# Patient Record
Sex: FEMALE | Race: WHITE | NOT HISPANIC OR LATINO | Employment: FULL TIME | URBAN - NONMETROPOLITAN AREA
[De-identification: names, ages, dates, MRNs, and addresses within clinical notes are randomized per-mention and may not be internally consistent; named-entity substitution may affect disease eponyms.]

---

## 2020-01-20 ENCOUNTER — OFFICE VISIT (OUTPATIENT)
Dept: CARDIOLOGY | Facility: CLINIC | Age: 55
End: 2020-01-20

## 2020-01-20 VITALS
WEIGHT: 142.2 LBS | DIASTOLIC BLOOD PRESSURE: 77 MMHG | SYSTOLIC BLOOD PRESSURE: 114 MMHG | HEART RATE: 76 BPM | BODY MASS INDEX: 23.69 KG/M2 | OXYGEN SATURATION: 99 % | HEIGHT: 65 IN

## 2020-01-20 DIAGNOSIS — R07.2 PRECORDIAL PAIN: Primary | ICD-10-CM

## 2020-01-20 DIAGNOSIS — Z76.89 ENCOUNTER TO ESTABLISH CARE: ICD-10-CM

## 2020-01-20 DIAGNOSIS — R06.02 SHORTNESS OF BREATH: ICD-10-CM

## 2020-01-20 DIAGNOSIS — R00.2 PALPITATIONS: ICD-10-CM

## 2020-01-20 DIAGNOSIS — R09.89 LEFT CAROTID BRUIT: ICD-10-CM

## 2020-01-20 PROCEDURE — 99204 OFFICE O/P NEW MOD 45 MIN: CPT | Performed by: PHYSICIAN ASSISTANT

## 2020-01-20 PROCEDURE — 93000 ELECTROCARDIOGRAM COMPLETE: CPT | Performed by: PHYSICIAN ASSISTANT

## 2020-01-20 NOTE — PROGRESS NOTES
Subjective   Sherie Reyes is a 54 y.o. female     Chief Complaint   Patient presents with   • Establish Care     Here to establish care        HPI  The patient presents today to establish cardiac care.  She presents predominantly because of dyspnea, but also because of intermittent chest tightness and history of fatigue as well.  She is concerned about potential cardiac involvement in her rather extensive current past medical history issues.  The patient reports history of lupus and scleroderma.  She would be concerned about a cardiac involvement as well.  Over the past several months, she has noted progressive dyspnea.  This now limits activity.  She has intermittent chest tightness at times.  She has precordial chest discomfort.  Again this is a tightness or pressure.  She has no referral to the neck, arm, or jaw.  She will have intensified dyspnea at that time.  She has no diaphoresis or nausea.  At times, she will have palpitations and even a sensation of tachycardia.  Symptoms tend to last several minutes and then resolved completely.  Dyspnea resolves when she rests for a prolonged amount of time.  Exercise capacity is progressively declining.  She has no PND orthopnea.  She has no syncope.  Blood pressures tend to be well controlled.  The patient reports normal labs when followed through her primary care provider recently.  She has no further complaints.  She would like evaluation at this time.      No current outpatient medications on file.     No current facility-administered medications for this visit.        Patient has no known allergies.    Past Medical History:   Diagnosis Date   • Lupus (CMS/HCC)    • Mixed connective tissue disease (CMS/HCC)    • Scleroderma (CMS/HCC)        Social History     Socioeconomic History   • Marital status:      Spouse name: Not on file   • Number of children: Not on file   • Years of education: Not on file   • Highest education level: Not on file   Tobacco Use   •  "Smoking status: Never Smoker   • Smokeless tobacco: Never Used   Substance and Sexual Activity   • Alcohol use: Never     Frequency: Never   • Drug use: Never   • Sexual activity: Defer       Family History   Problem Relation Age of Onset   • Hypertension Mother    • Heart attack Father    • Stroke Father        Review of Systems   Constitutional: Positive for fatigue (stays tired ). Negative for chills and fever.   HENT: Negative.  Negative for congestion and rhinorrhea.    Eyes: Positive for visual disturbance (reading glasses ).   Respiratory: Positive for cough (constant dry cough ), chest tightness (tightness in chest most of the time ) and shortness of breath (SOA with exertion ).    Cardiovascular: Positive for chest pain, palpitations (occasional flutters ) and leg swelling (occasional puffiness which resolves overnight ).   Gastrointestinal: Negative.  Negative for abdominal pain, blood in stool, nausea and vomiting.   Endocrine: Positive for cold intolerance. Negative for heat intolerance.   Genitourinary: Negative.  Negative for dysuria, frequency, hematuria and urgency.   Musculoskeletal: Positive for arthralgias (joints ). Negative for back pain.   Skin: Negative.  Negative for rash and wound.   Allergic/Immunologic: Negative.  Negative for environmental allergies and food allergies.   Neurological: Positive for weakness (weakness on right side ). Negative for dizziness and light-headedness.        Right foot feels cold most of the time   Hematological: Bruises/bleeds easily (bruises easily ).   Psychiatric/Behavioral: Positive for agitation (easily agitated ). Negative for confusion and sleep disturbance (denies waking with smothering or SOA). The patient is nervous/anxious (easily nervous ).        Objective     Vitals:    01/20/20 1105   BP: 114/77   BP Location: Left arm   Patient Position: Sitting   Pulse: 76   SpO2: 99%   Weight: 64.5 kg (142 lb 3.2 oz)   Height: 165.1 cm (65\")        /77 " "(BP Location: Left arm, Patient Position: Sitting)   Pulse 76   Ht 165.1 cm (65\")   Wt 64.5 kg (142 lb 3.2 oz)   SpO2 99%   BMI 23.66 kg/m²      Lab Results (most recent)     None          Physical Exam   Constitutional: She is oriented to person, place, and time. She appears well-developed and well-nourished. No distress.   HENT:   Head: Normocephalic and atraumatic.   Eyes: Pupils are equal, round, and reactive to light. Conjunctivae and EOM are normal.   Neck: Normal range of motion. Neck supple. No JVD present. Carotid bruit is present (Left). No tracheal deviation present.   Cardiovascular: Normal rate, regular rhythm, normal heart sounds and intact distal pulses.   Pulmonary/Chest: Effort normal and breath sounds normal.   Abdominal: Soft. Bowel sounds are normal. She exhibits no distension and no mass. There is no tenderness. There is no rebound and no guarding.   Musculoskeletal: Normal range of motion. She exhibits no edema, tenderness or deformity.   Neurological: She is alert and oriented to person, place, and time.   Skin: Skin is warm and dry. No rash noted. No erythema. No pallor.   Psychiatric: She has a normal mood and affect. Her behavior is normal. Judgment and thought content normal.   Nursing note and vitals reviewed.      Procedure     ECG 12 Lead  Date/Time: 1/20/2020 11:13 AM  Performed by: Sudhir Gill PA  Authorized by: Sudhir Gill PA   Previous ECG: no previous ECG available  Comments: Sinus rhythm at 66, normal axis, probable right atrial enlargement, no acute changes noted.                 Assessment/Plan      Diagnosis Plan   1. Encounter to establish care  ECG 12 Lead    Adult Transthoracic Echo Complete W/ Cont if Necessary Per Protocol    Stress Test With Myocardial Perfusion One Day    Duplex Carotid Ultrasound CAR   2. Precordial pain  Adult Transthoracic Echo Complete W/ Cont if Necessary Per Protocol    Stress Test With Myocardial Perfusion One Day    Duplex " Carotid Ultrasound CAR   3. Shortness of breath  Adult Transthoracic Echo Complete W/ Cont if Necessary Per Protocol    Stress Test With Myocardial Perfusion One Day    Duplex Carotid Ultrasound CAR   4. Palpitations  Adult Transthoracic Echo Complete W/ Cont if Necessary Per Protocol    Stress Test With Myocardial Perfusion One Day    Duplex Carotid Ultrasound CAR   5. Left carotid bruit  Adult Transthoracic Echo Complete W/ Cont if Necessary Per Protocol    Stress Test With Myocardial Perfusion One Day    Duplex Carotid Ultrasound CAR     1.  With increasing episodes of dyspnea and episodes of chest tightness noted lately as well, I will schedule the patient for a nuclear stress test for ischemia assessment.    2.  Because of history of lupus and medical history otherwise as outlined above, the patient will need an echo to evaluate her structurally.  We need to evaluate LV size and function, valvular morphologies, we can evaluate the pericardium, we can evaluate right-sided parameters, etc.    3.  The patient has an extremely soft left carotid bruit.  Because of that I would like to schedule for carotid duplex as well.    4.  For now, I would not add or make no changes to medical regimen.  We can see her back to review study results and make changes at that time if felt indicated.    5.  The patient will call us for any complications.  I will see her back to review results of the above studies and recommended further at that time.           Sherie Reyes  reports that she has never smoked. She has never used smokeless tobacco..        Patient's Body mass index is 23.66 kg/m². BMI is within normal parameters. No follow-up required..           Electronically signed by:

## 2020-02-11 ENCOUNTER — HOSPITAL ENCOUNTER (OUTPATIENT)
Dept: CARDIOLOGY | Facility: HOSPITAL | Age: 55
Discharge: HOME | End: 2020-02-11

## 2020-02-11 DIAGNOSIS — R07.2 PRECORDIAL PAIN: ICD-10-CM

## 2020-02-11 DIAGNOSIS — R09.89 LEFT CAROTID BRUIT: ICD-10-CM

## 2020-02-11 DIAGNOSIS — R06.02 SHORTNESS OF BREATH: ICD-10-CM

## 2020-02-11 DIAGNOSIS — R00.2 PALPITATIONS: ICD-10-CM

## 2020-02-11 DIAGNOSIS — Z76.89 ENCOUNTER TO ESTABLISH CARE: ICD-10-CM

## 2020-02-11 PROCEDURE — 0 TECHNETIUM SESTAMIBI: Performed by: INTERNAL MEDICINE

## 2020-02-11 PROCEDURE — 93018 CV STRESS TEST I&R ONLY: CPT | Performed by: INTERNAL MEDICINE

## 2020-02-11 PROCEDURE — 93880 EXTRACRANIAL BILAT STUDY: CPT | Performed by: INTERNAL MEDICINE

## 2020-02-11 PROCEDURE — A9500 TC99M SESTAMIBI: HCPCS | Performed by: INTERNAL MEDICINE

## 2020-02-11 PROCEDURE — 78452 HT MUSCLE IMAGE SPECT MULT: CPT | Performed by: INTERNAL MEDICINE

## 2020-02-11 PROCEDURE — 93306 TTE W/DOPPLER COMPLETE: CPT | Performed by: INTERNAL MEDICINE

## 2020-02-11 PROCEDURE — 93306 TTE W/DOPPLER COMPLETE: CPT

## 2020-02-11 PROCEDURE — 93017 CV STRESS TEST TRACING ONLY: CPT

## 2020-02-11 PROCEDURE — 93016 CV STRESS TEST SUPVJ ONLY: CPT | Performed by: NURSE PRACTITIONER

## 2020-02-11 PROCEDURE — 78452 HT MUSCLE IMAGE SPECT MULT: CPT

## 2020-02-11 PROCEDURE — 93880 EXTRACRANIAL BILAT STUDY: CPT

## 2020-02-11 RX ADMIN — TECHNETIUM TC 99M SESTAMIBI 1 DOSE: 1 INJECTION INTRAVENOUS at 08:17

## 2020-02-11 RX ADMIN — TECHNETIUM TC 99M SESTAMIBI 1 DOSE: 1 INJECTION INTRAVENOUS at 08:15

## 2020-02-12 LAB
BH CV ECHO MEAS - ACS: 1.9 CM
BH CV ECHO MEAS - AO MAX PG: 6.6 MMHG
BH CV ECHO MEAS - AO MEAN PG: 3 MMHG
BH CV ECHO MEAS - AO ROOT AREA (BSA CORRECTED): 1.4
BH CV ECHO MEAS - AO ROOT AREA: 4.7 CM^2
BH CV ECHO MEAS - AO ROOT DIAM: 2.5 CM
BH CV ECHO MEAS - AO V2 MAX: 128 CM/SEC
BH CV ECHO MEAS - AO V2 MEAN: 86.6 CM/SEC
BH CV ECHO MEAS - AO V2 VTI: 28.7 CM
BH CV ECHO MEAS - BSA(HAYCOCK): 1.7 M^2
BH CV ECHO MEAS - BSA: 1.7 M^2
BH CV ECHO MEAS - BZI_BMI: 23.6 KILOGRAMS/M^2
BH CV ECHO MEAS - BZI_METRIC_HEIGHT: 165.1 CM
BH CV ECHO MEAS - BZI_METRIC_WEIGHT: 64.4 KG
BH CV ECHO MEAS - EDV(CUBED): 53.2 ML
BH CV ECHO MEAS - EDV(MOD-SP4): 59.6 ML
BH CV ECHO MEAS - EDV(TEICH): 60.4 ML
BH CV ECHO MEAS - EF(CUBED): 62.6 %
BH CV ECHO MEAS - EF(MOD-SP4): 52.9 %
BH CV ECHO MEAS - EF(TEICH): 54.9 %
BH CV ECHO MEAS - ESV(CUBED): 19.9 ML
BH CV ECHO MEAS - ESV(MOD-SP4): 28.1 ML
BH CV ECHO MEAS - ESV(TEICH): 27.3 ML
BH CV ECHO MEAS - FS: 27.9 %
BH CV ECHO MEAS - IVS/LVPW: 0.91
BH CV ECHO MEAS - IVSD: 1.1 CM
BH CV ECHO MEAS - LA DIMENSION: 3 CM
BH CV ECHO MEAS - LA/AO: 1.2
BH CV ECHO MEAS - LV DIASTOLIC VOL/BSA (35-75): 34.9 ML/M^2
BH CV ECHO MEAS - LV IVRT: 0.11 SEC
BH CV ECHO MEAS - LV MASS(C)D: 137 GRAMS
BH CV ECHO MEAS - LV MASS(C)DI: 80.1 GRAMS/M^2
BH CV ECHO MEAS - LV SYSTOLIC VOL/BSA (12-30): 16.4 ML/M^2
BH CV ECHO MEAS - LVIDD: 3.8 CM
BH CV ECHO MEAS - LVIDS: 2.7 CM
BH CV ECHO MEAS - LVLD AP4: 6.9 CM
BH CV ECHO MEAS - LVLS AP4: 5.6 CM
BH CV ECHO MEAS - LVOT AREA (M): 3.1 CM^2
BH CV ECHO MEAS - LVOT AREA: 3.1 CM^2
BH CV ECHO MEAS - LVOT DIAM: 2 CM
BH CV ECHO MEAS - LVPWD: 1.2 CM
BH CV ECHO MEAS - MV A MAX VEL: 45.4 CM/SEC
BH CV ECHO MEAS - MV DEC SLOPE: 369 CM/SEC^2
BH CV ECHO MEAS - MV E MAX VEL: 70.3 CM/SEC
BH CV ECHO MEAS - MV E/A: 1.5
BH CV ECHO MEAS - RAP SYSTOLE: 10 MMHG
BH CV ECHO MEAS - RVDD: 2.5 CM
BH CV ECHO MEAS - RVSP: 23.8 MMHG
BH CV ECHO MEAS - SI(AO): 79.1 ML/M^2
BH CV ECHO MEAS - SI(CUBED): 19.4 ML/M^2
BH CV ECHO MEAS - SI(MOD-SP4): 18.4 ML/M^2
BH CV ECHO MEAS - SI(TEICH): 19.4 ML/M^2
BH CV ECHO MEAS - SV(AO): 135.3 ML
BH CV ECHO MEAS - SV(CUBED): 33.3 ML
BH CV ECHO MEAS - SV(MOD-SP4): 31.5 ML
BH CV ECHO MEAS - SV(TEICH): 33.1 ML
BH CV ECHO MEAS - TR MAX VEL: 186 CM/SEC
BH CV STRESS BP STAGE 1: NORMAL
BH CV STRESS BP STAGE 2: NORMAL
BH CV STRESS BP STAGE 3: NORMAL
BH CV STRESS DURATION MIN STAGE 1: 3
BH CV STRESS DURATION MIN STAGE 2: 3
BH CV STRESS DURATION MIN STAGE 3: 3
BH CV STRESS DURATION SEC STAGE 1: 0
BH CV STRESS DURATION SEC STAGE 2: 0
BH CV STRESS DURATION SEC STAGE 3: 0
BH CV STRESS GRADE STAGE 1: 10
BH CV STRESS GRADE STAGE 2: 12
BH CV STRESS GRADE STAGE 3: 14
BH CV STRESS HR STAGE 1: 110
BH CV STRESS HR STAGE 2: 141
BH CV STRESS HR STAGE 3: 143
BH CV STRESS METS STAGE 1: 5
BH CV STRESS METS STAGE 2: 7.5
BH CV STRESS METS STAGE 3: 10
BH CV STRESS PROTOCOL 1: NORMAL
BH CV STRESS RECOVERY BP: NORMAL MMHG
BH CV STRESS RECOVERY HR: 75 BPM
BH CV STRESS SPEED STAGE 1: 1.7
BH CV STRESS SPEED STAGE 2: 2.5
BH CV STRESS SPEED STAGE 3: 3.4
BH CV STRESS STAGE 1: 1
BH CV STRESS STAGE 2: 2
BH CV STRESS STAGE 3: 3
BH CV XLRA MEAS LEFT DIST CCA EDV: 33 CM/SEC
BH CV XLRA MEAS LEFT DIST CCA PSV: 100 CM/SEC
BH CV XLRA MEAS LEFT DIST ICA EDV: 38 CM/SEC
BH CV XLRA MEAS LEFT DIST ICA PSV: 99 CM/SEC
BH CV XLRA MEAS LEFT ICA/CCA RATIO: 0.99
BH CV XLRA MEAS LEFT MID CCA EDV: 30 CM/SEC
BH CV XLRA MEAS LEFT MID CCA PSV: 108 CM/SEC
BH CV XLRA MEAS LEFT MID ICA EDV: 65 CM/SEC
BH CV XLRA MEAS LEFT MID ICA PSV: 99 CM/SEC
BH CV XLRA MEAS LEFT PROX CCA EDV: 27 CM/SEC
BH CV XLRA MEAS LEFT PROX CCA PSV: 98 CM/SEC
BH CV XLRA MEAS LEFT PROX ECA EDV: 10 CM/SEC
BH CV XLRA MEAS LEFT PROX ECA PSV: 62 CM/SEC
BH CV XLRA MEAS LEFT PROX ICA EDV: 34 CM/SEC
BH CV XLRA MEAS LEFT PROX ICA PSV: 92 CM/SEC
BH CV XLRA MEAS LEFT VERTEBRAL A EDV: 20 CM/SEC
BH CV XLRA MEAS LEFT VERTEBRAL A PSV: 52 CM/SEC
BH CV XLRA MEAS RIGHT DIST CCA EDV: 37 CM/SEC
BH CV XLRA MEAS RIGHT DIST CCA PSV: 106 CM/SEC
BH CV XLRA MEAS RIGHT DIST ICA EDV: 54 CM/SEC
BH CV XLRA MEAS RIGHT DIST ICA PSV: 130 CM/SEC
BH CV XLRA MEAS RIGHT ICA/CCA RATIO: 1.23
BH CV XLRA MEAS RIGHT MID CCA EDV: 33 CM/SEC
BH CV XLRA MEAS RIGHT MID CCA PSV: 121 CM/SEC
BH CV XLRA MEAS RIGHT MID ICA EDV: 82 CM/SEC
BH CV XLRA MEAS RIGHT MID ICA PSV: 127 CM/SEC
BH CV XLRA MEAS RIGHT PROX CCA EDV: 25 CM/SEC
BH CV XLRA MEAS RIGHT PROX CCA PSV: 87 CM/SEC
BH CV XLRA MEAS RIGHT PROX ECA EDV: 10 CM/SEC
BH CV XLRA MEAS RIGHT PROX ECA PSV: 65 CM/SEC
BH CV XLRA MEAS RIGHT PROX ICA EDV: 25 CM/SEC
BH CV XLRA MEAS RIGHT PROX ICA PSV: 73 CM/SEC
BH CV XLRA MEAS RIGHT VERTEBRAL A EDV: 18 CM/SEC
BH CV XLRA MEAS RIGHT VERTEBRAL A PSV: 67 CM/SEC
MAXIMAL PREDICTED HEART RATE: 166 BPM
MAXIMAL PREDICTED HEART RATE: 166 BPM
PERCENT MAX PREDICTED HR: 86.14 %
STRESS BASELINE BP: NORMAL MMHG
STRESS BASELINE HR: 80 BPM
STRESS PERCENT HR: 101 %
STRESS POST ESTIMATED WORKLOAD: 7 METS
STRESS POST EXERCISE DUR MIN: 7 MIN
STRESS POST EXERCISE DUR SEC: 5 SEC
STRESS POST PEAK BP: NORMAL MMHG
STRESS POST PEAK HR: 143 BPM
STRESS TARGET HR: 141 BPM
STRESS TARGET HR: 141 BPM

## 2020-05-07 ENCOUNTER — OFFICE VISIT (OUTPATIENT)
Dept: CARDIOLOGY | Facility: CLINIC | Age: 55
End: 2020-05-07

## 2020-05-07 VITALS — BODY MASS INDEX: 23.66 KG/M2 | WEIGHT: 142 LBS | HEIGHT: 65 IN

## 2020-05-07 DIAGNOSIS — R06.02 SHORTNESS OF BREATH: ICD-10-CM

## 2020-05-07 DIAGNOSIS — R07.2 PRECORDIAL PAIN: Primary | ICD-10-CM

## 2020-05-07 DIAGNOSIS — R00.2 PALPITATIONS: ICD-10-CM

## 2020-05-07 PROCEDURE — 99441 PR PHYS/QHP TELEPHONE EVALUATION 5-10 MIN: CPT | Performed by: PHYSICIAN ASSISTANT

## 2020-05-07 RX ORDER — MYCOPHENOLATE MOFETIL 500 MG/1
1 TABLET ORAL 2 TIMES DAILY
COMMUNITY
Start: 2020-04-28

## 2020-05-07 NOTE — PROGRESS NOTES
Problem list     Subjective   Sherie Reyes is a 54 y.o. female     Chief Complaint   Patient presents with   • Chest Pain     Telephone visit for a follow up    • Palpitations       HPI  You have chosen to receive care through a telephone visit. Do you consent to use a telephone visit for your medical care today? Yes    The patient is evaluated today by transtelephonic/telecommunication protocol in the setting of current coronavirus pandemic.  This patient is opted for transtelephonic interview.  The patient has a history of interstitial lung disease, lupus, etc. as outlined below.  We have been asked to see her because of chest discomfort and dyspnea.  She was questionably felt to have a left carotid bruit last exam.  She was subsequent scheduled for appropriate testing.  Stress test indicated no evidence of ischemia.  Echo suggested preserved systolic function with no significant valvular dysfunction.  She had a small effusion with no tamponade physiology noted however.  Symptomatically, the patient still has chest tightness and shortness of air.  She still has intermittent palpitations but nothing of significance.  She feels that symptoms likely related more to interstitial lung disease.        Current Outpatient Medications on File Prior to Visit   Medication Sig Dispense Refill   • mycophenolate (CELLCEPT) 500 MG tablet Take 1 tablet by mouth 2 (Two) Times a Day.       No current facility-administered medications on file prior to visit.        Patient has no known allergies.    Past Medical History:   Diagnosis Date   • Interstitial lung disease due to connective tissue disease (CMS/HCC)    • Lupus (CMS/HCC)    • Mixed connective tissue disease (CMS/HCC)    • Scleroderma (CMS/HCC)        Social History     Socioeconomic History   • Marital status:      Spouse name: Not on file   • Number of children: Not on file   • Years of education: Not on file   • Highest education level: Not on file   Tobacco Use   •  "Smoking status: Never Smoker   • Smokeless tobacco: Never Used   Substance and Sexual Activity   • Alcohol use: Never     Frequency: Never   • Drug use: Never   • Sexual activity: Defer       Family History   Problem Relation Age of Onset   • Hypertension Mother    • Heart attack Father    • Stroke Father        Review of Systems   Constitutional: Positive for fatigue (tires easily ). Negative for chills and fever.   HENT: Negative.  Negative for congestion, rhinorrhea and sore throat.    Eyes: Positive for visual disturbance (reading glasses ).   Respiratory: Positive for chest tightness (squeezing sensation ) and shortness of breath.    Cardiovascular: Positive for palpitations and leg swelling (Occasional puffiness ). Negative for chest pain.   Gastrointestinal: Negative.  Negative for abdominal pain, blood in stool, nausea and vomiting.   Endocrine: Positive for cold intolerance. Negative for heat intolerance.   Genitourinary: Negative.  Negative for dysuria, frequency, hematuria and urgency.   Musculoskeletal: Negative.  Negative for arthralgias and back pain.   Skin: Negative.  Negative for rash and wound.   Allergic/Immunologic: Negative.  Negative for environmental allergies and food allergies.   Neurological: Positive for light-headedness (a couple of occasions ). Negative for dizziness and weakness.   Hematological: Negative.  Does not bruise/bleed easily.   Psychiatric/Behavioral: Negative.  Negative for agitation, confusion and sleep disturbance (denies waking with smothering ). The patient is not nervous/anxious.        Objective   Vitals:    05/07/20 1547   Weight: 64.4 kg (142 lb)   Height: 165.1 cm (65\")      Ht 165.1 cm (65\")   Wt 64.4 kg (142 lb) Comment: pt reported  BMI 23.63 kg/m²    Lab Results (most recent)     None        Physical Exam      Procedure   Procedures       Assessment/Plan      Diagnosis Plan   1. Precordial pain     2. Shortness of breath     3. Palpitations       1.  We have " reviewed the patient's test results with her today.  Nuclear stress test indicates no evidence of ischemia.  Echo suggest preserved systolic function with no significant valvular issues.  Her carotid duplex indicated minor disease in bilateral carotid systems with antegrade flow in bilateral vertebral arteries.    2.  At this time, her chest pain and dyspnea is persistent.  Her palpitations are minimal at this time.  She feels is most symptoms are related more to interstitial lung disease.  She will request follow-up with her pulmonology team at this time.    3.  We have encouraged her that with stable clinical course and benign cardiac work-up, nothing further would be indicated.  We will see her back in 6 months to reevaluate symptoms.  She will call back for issues prior to follow-up.           Sherie Reyes  reports that she has never smoked. She has never used smokeless tobacco..         Patient's Body mass index is 23.63 kg/m². BMI is within normal parameters. No follow-up required..         This visit has been rescheduled as a phone visit to comply with patient safety concerns in accordance with CDC recommendations. Total time of discussion was 5 minutes.    Electronically signed by:

## 2022-10-12 ENCOUNTER — HOSPITAL ENCOUNTER (EMERGENCY)
Facility: HOSPITAL | Age: 57
End: 2022-10-12

## 2022-10-12 ENCOUNTER — TRANSCRIBE ORDERS (OUTPATIENT)
Dept: CARDIOLOGY | Facility: HOSPITAL | Age: 57
End: 2022-10-12

## 2022-10-12 DIAGNOSIS — M34.9 SYSTEMIC SCLEROSIS: Primary | ICD-10-CM

## 2022-11-04 ENCOUNTER — HOSPITAL ENCOUNTER (OUTPATIENT)
Dept: CARDIOLOGY | Facility: HOSPITAL | Age: 57
Discharge: HOME | End: 2022-11-04
Admitting: INTERNAL MEDICINE

## 2022-11-04 DIAGNOSIS — M34.9 SYSTEMIC SCLEROSIS: ICD-10-CM

## 2022-11-04 PROCEDURE — 93306 TTE W/DOPPLER COMPLETE: CPT | Performed by: INTERNAL MEDICINE

## 2022-11-04 PROCEDURE — 93306 TTE W/DOPPLER COMPLETE: CPT

## 2022-11-15 LAB
BH CV ECHO MEAS - ACS: 1.71 CM
BH CV ECHO MEAS - AO MAX PG: 5.8 MMHG
BH CV ECHO MEAS - AO MEAN PG: 3.3 MMHG
BH CV ECHO MEAS - AO ROOT DIAM: 2.8 CM
BH CV ECHO MEAS - AO V2 MAX: 120.8 CM/SEC
BH CV ECHO MEAS - AO V2 VTI: 27.7 CM
BH CV ECHO MEAS - EDV(CUBED): 63.5 ML
BH CV ECHO MEAS - EDV(MOD-SP4): 58.3 ML
BH CV ECHO MEAS - EF(MOD-SP4): 59 %
BH CV ECHO MEAS - EF_3D-VOL: 63 %
BH CV ECHO MEAS - ESV(CUBED): 19 ML
BH CV ECHO MEAS - ESV(MOD-SP4): 23.9 ML
BH CV ECHO MEAS - FS: 33.1 %
BH CV ECHO MEAS - IVS/LVPW: 0.92 CM
BH CV ECHO MEAS - IVSD: 1.02 CM
BH CV ECHO MEAS - LA DIMENSION: 3.3 CM
BH CV ECHO MEAS - LAT PEAK E' VEL: 11.3 CM/SEC
BH CV ECHO MEAS - LV DIASTOLIC VOL/BSA (35-75): 34.1 CM2
BH CV ECHO MEAS - LV MASS(C)D: 138.5 GRAMS
BH CV ECHO MEAS - LV SYSTOLIC VOL/BSA (12-30): 14 CM2
BH CV ECHO MEAS - LVIDD: 4 CM
BH CV ECHO MEAS - LVIDS: 2.7 CM
BH CV ECHO MEAS - LVOT AREA: 3.4 CM2
BH CV ECHO MEAS - LVOT DIAM: 2.08 CM
BH CV ECHO MEAS - LVPWD: 1.11 CM
BH CV ECHO MEAS - MED PEAK E' VEL: 7.9 CM/SEC
BH CV ECHO MEAS - MV A MAX VEL: 51 CM/SEC
BH CV ECHO MEAS - MV DEC SLOPE: 225 CM/SEC2
BH CV ECHO MEAS - MV E MAX VEL: 69.8 CM/SEC
BH CV ECHO MEAS - MV E/A: 1.37
BH CV ECHO MEAS - RVDD: 2.8 CM
BH CV ECHO MEAS - SI(MOD-SP4): 20.1 ML/M2
BH CV ECHO MEAS - SV(MOD-SP4): 34.4 ML
BH CV ECHO MEASUREMENTS AVERAGE E/E' RATIO: 7.27
LEFT ATRIUM VOLUME INDEX: 22.2 ML/M2
MAXIMAL PREDICTED HEART RATE: 163 BPM
STRESS TARGET HR: 139 BPM

## 2023-11-02 ENCOUNTER — OFFICE VISIT (OUTPATIENT)
Dept: CARDIOLOGY | Facility: CLINIC | Age: 58
End: 2023-11-02
Payer: COMMERCIAL

## 2023-11-02 VITALS
OXYGEN SATURATION: 100 % | HEIGHT: 65 IN | WEIGHT: 157.4 LBS | SYSTOLIC BLOOD PRESSURE: 136 MMHG | BODY MASS INDEX: 26.23 KG/M2 | HEART RATE: 69 BPM | DIASTOLIC BLOOD PRESSURE: 73 MMHG

## 2023-11-02 DIAGNOSIS — I70.0 AORTIC ATHEROSCLEROSIS: Primary | ICD-10-CM

## 2023-11-02 DIAGNOSIS — R20.9 BILATERAL COLD FEET: ICD-10-CM

## 2023-11-02 DIAGNOSIS — R07.2 PRECORDIAL PAIN: ICD-10-CM

## 2023-11-02 DIAGNOSIS — I73.00 RAYNAUD'S DISEASE WITHOUT GANGRENE: ICD-10-CM

## 2023-11-02 DIAGNOSIS — R06.02 SHORTNESS OF BREATH: ICD-10-CM

## 2023-11-02 DIAGNOSIS — R00.2 PALPITATIONS: ICD-10-CM

## 2023-11-02 PROBLEM — M32.9 SYSTEMIC LUPUS ERYTHEMATOSUS: Status: ACTIVE | Noted: 2023-11-02

## 2023-11-02 PROBLEM — M35.9 AUTOIMMUNE DISEASE: Status: ACTIVE | Noted: 2020-11-06

## 2023-11-02 PROBLEM — J84.9 ILD (INTERSTITIAL LUNG DISEASE): Status: ACTIVE | Noted: 2020-11-06

## 2023-11-02 PROCEDURE — 99204 OFFICE O/P NEW MOD 45 MIN: CPT | Performed by: INTERNAL MEDICINE

## 2023-11-02 RX ORDER — LIFITEGRAST 50 MG/ML
1 SOLUTION/ DROPS OPHTHALMIC 2 TIMES DAILY
COMMUNITY

## 2023-11-02 NOTE — PROGRESS NOTES
"Jorge Reyes is a 58 y.o. female     Chief Complaint   Patient presents with   • Establish Care     Here for eval. Per pcp and abnl. LDCT    • Chest Pain   • Shortness of Breath   • Palpitations   • mild aortic atherosclerotic disease per LDCT       PROBLEM LIST:     Chest Pain  1.1 Stress test, 2-. No ischemia  Shortness of breath  Palpitations  Aortic atherosclerosis and left main atherosclerosis per LDCT at [facility] on 6-9-2023  Scleroderma  ILD, followed by Dr. Sharma.  Lupus  8. JOANIE / PAD  8.1. Carotid U/S, 2-. 16-49% mid / distal RE. <= to 15% in LICA  9. Echo, 11-4-2022. EF 55-60%, trivial MR / AI, physiologic TR, trivial pericardial effusion        Specialty Problems    None        HPI:    Ms. Sherie Reyes is a 58-year-old female patient of Dr. Nato Lisa seen today for evaluation of chest pain.    The patient had a low-dose CT scan of the chest because of smoking history.  That study demonstrated left main atherosclerosis as well as plaque in the aorta.  Ms. Reyes describes chest discomfort.  She has a pressure-like sensation in the mid chest which she also describes as a heaviness.  She uses Merritt sign and describes that symptoms are significantly worsened with exertion.  The patient has class III exertional dyspnea but no orthopnea or PND.    The patient has known autoimmune disease with both scleroderma and Raynaud's being diagnosed in the past.  She is also diagnosed with having esophageal stricture.  She has no known cardiac involvement however.    Ms. Reyes also describes episodes when she can \"hear\" her heartbeat in her left ear.  These seem to occur in conjunction with symptoms compatible with sensed extrasystoles.  She also describes Raynaud's phenomenon with a chronic intermittent cold right foot.                    PRIOR MEDICATIONS    Current Outpatient Medications on File Prior to Visit   Medication Sig Dispense Refill   • mycophenolate (CELLCEPT) 500 MG tablet Take 3 " tablets by mouth Daily.     • Xiidra 5 % ophthalmic solution Administer 1 drop to both eyes 2 (Two) Times a Day.       No current facility-administered medications on file prior to visit.       ALLERGIES:    Patient has no known allergies.    PAST MEDICAL HISTORY:    Past Medical History:   Diagnosis Date   • Aortic atherosclerosis    • Interstitial lung disease due to connective tissue disease    • Lupus    • Mixed connective tissue disease    • Scleroderma        SURGICAL HISTORY:    Past Surgical History:   Procedure Laterality Date   • ESOPHAGEAL DILATATION     • HYSTERECTOMY     • UMBILICAL HERNIA REPAIR         SOCIAL HISTORY:    Social History     Socioeconomic History   • Marital status:    Tobacco Use   • Smoking status: Never   • Smokeless tobacco: Never   Substance and Sexual Activity   • Alcohol use: Never   • Drug use: Never   • Sexual activity: Defer       FAMILY HISTORY:    Family History   Problem Relation Age of Onset   • Hypertension Mother    • Heart attack Father    • Stroke Father        Review of Systems   Constitutional:  Positive for fatigue. Negative for chills, diaphoresis, fever and unexpected weight change.   HENT: Negative.     Eyes: Negative.    Respiratory:  Positive for shortness of breath.    Cardiovascular:  Positive for chest pain (pressure / heaviness all the time., nothing relieves it.), palpitations and leg swelling.   Gastrointestinal:  Negative for blood in stool (occas. melena, no hematuria,hemoptysis,hematochezia), constipation and diarrhea.   Endocrine: Positive for cold intolerance.   Genitourinary: Negative.    Musculoskeletal:  Positive for arthralgias and myalgias.   Skin: Negative.    Allergic/Immunologic: Negative.    Neurological: Negative.    Hematological:  Bruises/bleeds easily.   Psychiatric/Behavioral:  Positive for sleep disturbance.        VISIT VITALS:  Vitals:    11/02/23 1103   BP: 136/73   BP Location: Left arm   Patient Position: Sitting   Pulse:  "69   SpO2: 100%   Weight: 71.4 kg (157 lb 6.4 oz)   Height: 165.1 cm (65\")      /73 (BP Location: Left arm, Patient Position: Sitting)   Pulse 69   Ht 165.1 cm (65\")   Wt 71.4 kg (157 lb 6.4 oz)   SpO2 100%   BMI 26.19 kg/m²     RECENT LABS:    Objective       Physical Exam  Vitals and nursing note reviewed.   Constitutional:       General: She is not in acute distress.     Appearance: She is well-developed.   HENT:      Head: Normocephalic and atraumatic.   Eyes:      Conjunctiva/sclera: Conjunctivae normal.      Pupils: Pupils are equal, round, and reactive to light.      Comments: Mild ptosis of left eye  No lid lag  Extra ocular motions intact  HAZEL   Neck:      Vascular: No carotid bruit, hepatojugular reflux or JVD.      Trachea: No tracheal deviation.      Comments: Nl. Carotid upstrokes  Cardiovascular:      Rate and Rhythm: Normal rate and regular rhythm.      Pulses:           Radial pulses are 2+ on the right side and 2+ on the left side.      Heart sounds: S1 normal and S2 normal. No murmur heard.     No friction rub. Gallop present. S4 (soft) sounds present.      Comments: No pericardial knock  Pulmonary:      Effort: Pulmonary effort is normal.      Breath sounds: Normal breath sounds. No wheezing, rhonchi or rales.      Comments: Crackles to mid left field and bilat. Lower fields    Abdominal:      General: Bowel sounds are normal. There is no distension or abdominal bruit.      Palpations: Abdomen is soft. There is no mass.      Tenderness: There is no abdominal tenderness. There is no guarding or rebound.      Comments: No organomegaly   Musculoskeletal:         General: No tenderness or deformity. Normal range of motion.      Cervical back: Normal range of motion and neck supple.      Right lower leg: No edema.      Left lower leg: Edema present.      Comments: LLE, trace edema, palpable pedal pulses, cap. Refill 2-3 sec. , foot not as cold as rt.   RLE, no edema, palpable pedal pulses, " foot cold to touch, cap. Refill 3 sec.   Cap, refill 2-3 sec's on fingers  No clubbing   Skin:     General: Skin is warm and dry.      Coloration: Skin is not pale.      Findings: No erythema or rash.   Neurological:      Mental Status: She is alert and oriented to person, place, and time.   Psychiatric:         Behavior: Behavior normal.         Thought Content: Thought content normal.         Judgment: Judgment normal.         ECG 12 Lead    Date/Time: 3/12/2024 3:37 PM  Performed by: Darinel Devine MD    Authorized by: Darinel Devine MD  Comparison: compared with previous ECG from 1/20/2020  Comments: Sinus at 66 bpm.  Right atrial enlargement.  Otherwise within normal limits and unchanged from prior.          Assessment & Plan   #1  Chest pain.  The patient has atherosclerotic coronary artery disease by low-dose CT scan.  We will perform pharmacologic stress testing is a physiologic assessment, for restratification, and to direct therapy.    2.  Palpitations.  Will use 30-day event monitor to further evaluate and to exclude atrial fibrillation which would require full dose anticoagulation for stroke prophylaxis    3.  Cold right foot.  The patient describes Raynaud's.  However, with physical exam findings as above, I would like to perform lower extremity duplex study to exclude obstructive arterial disease.    4.  Since extrasystoles with an abnormal sound in her ears.  If symptoms persist we will repeat carotid duplex study to exclude hemodynamically significant disease.    5.  Exertional dyspnea.  This is multifactorial in etiology with a possible ischemic component which we will assess as above.    6.  The patient will follow with Butch Padilla and Yaquelin as instructed we will plan on seeing her in follow-up after testing or on appearing basis as discussed.   Diagnosis Plan   1. Aortic atherosclerosis        2. Palpitations        3. Precordial pain        4. Shortness of breath            No  follow-ups on file.         Sherie Reyes  reports that she has never smoked. She has never used smokeless tobacco.. I have educated her on the risk of diseases from using tobacco products such as cancer, COPD, and heart disease.               BMI is >= 25 and <30. (Overweight) The following options were offered after discussion;: pcp addressing               Electronically signed by:    Scribed for Darinel Devine MD by Samreen Motta LPN on November 2, 2023  at 11:10 EDT    I, Darinel Devine MD personally performed the services described in this documentation as scribed by the above named individual in my presence, and it is both accurate and complete. November 2, 2023 11:10 EDT      Dictated Utilizing Dragon Dictation: Part of this note may be an electronic transcription/translation of spoken language to printed text using the Dragon Dictation System.

## 2023-11-08 ENCOUNTER — TELEPHONE (OUTPATIENT)
Dept: CARDIOLOGY | Facility: CLINIC | Age: 58
End: 2023-11-08
Payer: COMMERCIAL

## 2023-11-08 NOTE — TELEPHONE ENCOUNTER
Caller: Sherie Reyes    Relationship to patient: Self    Best call back number: [phone]    Patient is needing: TO HAVE LAST THURSDAY'S PN PRINTED OFF FOR HER.  PT IS ALSO WEARING A MONITOR AND WOULD LIKE TO KNOW HOW LONG SHE IS GOING TO HAVE TO WEAR THE DEVICE. DOES SHE WEAR IT TUESDAY FOR HER STRESS TEST? PLEASE ADVISE THANK YOU

## 2023-11-08 NOTE — TELEPHONE ENCOUNTER
Pt will need to come and sign a form to have records printed, she can do that the day of her testing if she would like.     Per chart review, monitor was ordered for 30 days. IF they need the monitor removed for her stress test, she can remove it and put it back on ASAP after.       I called and informed pt of the above, she verbalized understanding. She asked if she could P/U the most recent Ov today. I explained that we can't release it until the note is signed. I stated that we can look when she comes in for testing or she could call and check back in a few days.

## 2023-11-13 ENCOUNTER — HOSPITAL ENCOUNTER (OUTPATIENT)
Dept: CARDIOLOGY | Facility: HOSPITAL | Age: 58
Discharge: HOME | End: 2023-11-13
Admitting: INTERNAL MEDICINE
Payer: COMMERCIAL

## 2023-11-13 DIAGNOSIS — I73.00 RAYNAUD'S DISEASE WITHOUT GANGRENE: ICD-10-CM

## 2023-11-13 DIAGNOSIS — R20.9 BILATERAL COLD FEET: ICD-10-CM

## 2023-11-13 LAB
BH CV LEA RIGHT ANT TIBIAL A DISTAL PSV: 41 CM/S
BH CV LEA RIGHT CFA PROX PSV: 136 CM/S
BH CV LEA RIGHT DFA PROX PSV: 76 CM/S
BH CV LEA RIGHT POPITEAL A  PROX PSV: 61 CM/S
BH CV LEA RIGHT PTA DISTAL PSV: 41 CM/S
BH CV LEA RIGHT SFA DISTAL PSV: -53.9 CM/S
BH CV LEA RIGHT SFA MID PSV: -68 CM/S
BH CV LEA RIGHT SFA PROX PSV: -94.6 CM/S
RIGHT GROIN CFA SYS: 136 CM/SEC

## 2023-11-13 PROCEDURE — 93926 LOWER EXTREMITY STUDY: CPT

## 2023-11-13 PROCEDURE — 93926 LOWER EXTREMITY STUDY: CPT | Performed by: INTERNAL MEDICINE

## 2023-11-14 ENCOUNTER — HOSPITAL ENCOUNTER (OUTPATIENT)
Dept: CARDIOLOGY | Facility: HOSPITAL | Age: 58
Discharge: HOME | End: 2023-11-14
Payer: COMMERCIAL

## 2023-11-14 DIAGNOSIS — R00.2 PALPITATIONS: ICD-10-CM

## 2023-11-14 DIAGNOSIS — R07.2 PRECORDIAL PAIN: ICD-10-CM

## 2023-11-14 DIAGNOSIS — R06.02 SHORTNESS OF BREATH: ICD-10-CM

## 2023-11-14 DIAGNOSIS — I70.0 AORTIC ATHEROSCLEROSIS: ICD-10-CM

## 2023-11-14 LAB
BH CV REST NUCLEAR ISOTOPE DOSE: 10 MCI
BH CV STRESS COMMENTS STAGE 1: NORMAL
BH CV STRESS DOSE REGADENOSON STAGE 1: 0.4
BH CV STRESS DURATION MIN STAGE 1: 0
BH CV STRESS DURATION SEC STAGE 1: 10
BH CV STRESS NUCLEAR ISOTOPE DOSE: 30 MCI
BH CV STRESS PROTOCOL 1: NORMAL
BH CV STRESS RECOVERY BP: NORMAL MMHG
BH CV STRESS RECOVERY HR: 69 BPM
BH CV STRESS STAGE 1: 1
MAXIMAL PREDICTED HEART RATE: 162 BPM
PERCENT MAX PREDICTED HR: 50.62 %
STRESS BASELINE BP: NORMAL MMHG
STRESS BASELINE HR: 59 BPM
STRESS PERCENT HR: 60 %
STRESS POST PEAK BP: NORMAL MMHG
STRESS POST PEAK HR: 82 BPM
STRESS TARGET HR: 138 BPM

## 2023-11-14 PROCEDURE — 78452 HT MUSCLE IMAGE SPECT MULT: CPT

## 2023-11-14 PROCEDURE — 93017 CV STRESS TEST TRACING ONLY: CPT

## 2023-11-14 PROCEDURE — A9500 TC99M SESTAMIBI: HCPCS | Performed by: INTERNAL MEDICINE

## 2023-11-14 PROCEDURE — 0 TECHNETIUM SESTAMIBI: Performed by: INTERNAL MEDICINE

## 2023-11-14 PROCEDURE — 25010000002 REGADENOSON 0.4 MG/5ML SOLUTION: Performed by: INTERNAL MEDICINE

## 2023-11-14 RX ORDER — REGADENOSON 0.08 MG/ML
0.4 INJECTION, SOLUTION INTRAVENOUS
Status: COMPLETED | OUTPATIENT
Start: 2023-11-14 | End: 2023-11-14

## 2023-11-14 RX ADMIN — REGADENOSON 0.4 MG: 0.08 INJECTION, SOLUTION INTRAVENOUS at 10:16

## 2023-11-14 RX ADMIN — TECHNETIUM TC 99M SESTAMIBI 1 DOSE: 1 INJECTION INTRAVENOUS at 10:17

## 2023-11-14 RX ADMIN — TECHNETIUM TC 99M SESTAMIBI 1 DOSE: 1 INJECTION INTRAVENOUS at 08:23

## 2023-11-17 ENCOUNTER — TELEPHONE (OUTPATIENT)
Dept: CARDIOLOGY | Facility: CLINIC | Age: 58
End: 2023-11-17
Payer: COMMERCIAL

## 2023-11-17 NOTE — TELEPHONE ENCOUNTER
PATIENT NOTIFIED OF RLE U/S AND TO KEEP F/U APPT. LAURA,MARCUSN            ----- Message from Darinel Devine MD sent at 11/17/2023 11:18 AM EST -----  Keep f/u appt.   ----- Message -----  From: Darinel Devine MD  Sent: 11/13/2023   7:41 PM EST  To: Darinel Devine MD

## 2023-11-29 ENCOUNTER — TELEPHONE (OUTPATIENT)
Dept: CARDIOLOGY | Facility: CLINIC | Age: 58
End: 2023-11-29
Payer: COMMERCIAL

## 2023-11-29 NOTE — TELEPHONE ENCOUNTER
PATIENT NOTIFIED OF STRESS TEST RESULTS AND TO KEEP /U APPT. LAURA,MARCUSN            ----- Message from Darinel Devine MD sent at 11/29/2023  9:02 AM EST -----  Keep f/u appt.   ----- Message -----  From: Darinel Devine MD  Sent: 11/27/2023   9:51 PM EST  To: Darinel Devine MD

## 2024-01-02 ENCOUNTER — OFFICE VISIT (OUTPATIENT)
Dept: CARDIOLOGY | Facility: CLINIC | Age: 59
End: 2024-01-02
Payer: COMMERCIAL

## 2024-01-02 VITALS
SYSTOLIC BLOOD PRESSURE: 122 MMHG | DIASTOLIC BLOOD PRESSURE: 81 MMHG | HEIGHT: 65 IN | HEART RATE: 72 BPM | BODY MASS INDEX: 26.39 KG/M2 | WEIGHT: 158.4 LBS | OXYGEN SATURATION: 94 %

## 2024-01-02 DIAGNOSIS — R00.2 PALPITATIONS: ICD-10-CM

## 2024-01-02 DIAGNOSIS — I70.0 AORTIC ATHEROSCLEROSIS: Primary | ICD-10-CM

## 2024-01-02 NOTE — PROGRESS NOTES
Subjective     Sherie Reyes is a 58 y.o. female who presents today for Follow-up (Stress/monitor/duplex results).    CHIEF COMPLIANT  Chief Complaint   Patient presents with    Follow-up     Stress/monitor/duplex results       Active Problems:  Chest Pain  1.1 Stress test, 2-. No ischemia  1.2 11/14/23: nuclear stress: negative for ischemia, EF 73%  Shortness of breath  Palpitations  Aortic atherosclerosis per LDCT at [facility] on 6-9-2023  Scleroderma  ILD, followed by Dr. Sharma.  Lupus  8. JOANIE / PAD  8.1. Carotid U/S, 2-. 16-49% mid / distal RE. <= to 15% in LICA  9. Echo, 11-4-2022. EF 55-60%, trivial MR / AI, physiologic TR, trivial pericardial effusion  10. Cardiac event monitor: NSR baseline, max , min 51.  Frequent sinus tachycardia.  No ectopy, arrhythmia or block noted.     11. Lower extremity duplex: No evidence of hemodynamically significant stenosis    HPI  The patient is a 58-year-old female that returns for follow-up to discuss recent testing.  The patient underwent a lower extremity arterial duplex on 11/13/2023 which showed no evidence of hemodynamically significant stenosis.  She underwent a nuclear stress test which was negative for ischemia.  She also wore a cardiac event monitor which did show frequent sinus tachycardia but no ectopy, arrhythmia or block noted.  She denies chest pain or shortness of air.  Heart rate and blood pressure stable.    PRIOR MEDS  Current Outpatient Medications on File Prior to Visit   Medication Sig Dispense Refill    mycophenolate (CELLCEPT) 500 MG tablet Take 3 tablets by mouth Daily.      Xiidra 5 % ophthalmic solution Administer 1 drop to both eyes 2 (Two) Times a Day.       No current facility-administered medications on file prior to visit.       ALLERGIES  Patient has no known allergies.    HISTORY  Past Medical History:   Diagnosis Date    Aortic atherosclerosis     Coronary artery disease     Interstial lung disease    Interstitial lung disease  "due to connective tissue disease     Lupus     Mixed connective tissue disease     Scleroderma        Social History     Socioeconomic History    Marital status:    Tobacco Use    Smoking status: Never    Smokeless tobacco: Never   Substance and Sexual Activity    Alcohol use: Never    Drug use: Never    Sexual activity: Defer       Family History   Problem Relation Age of Onset    Hypertension Mother     Heart attack Father     Stroke Father        Review of Systems   Constitutional:  Positive for fatigue. Negative for chills, diaphoresis and fever.   HENT: Negative.     Eyes: Negative.  Negative for visual disturbance.   Respiratory:  Positive for shortness of breath. Negative for apnea, cough, chest tightness and wheezing.    Cardiovascular: Negative.  Negative for chest pain, palpitations and leg swelling.   Gastrointestinal: Negative.  Negative for blood in stool.   Endocrine: Negative.  Negative for cold intolerance and heat intolerance.   Genitourinary: Negative.  Negative for hematuria.   Musculoskeletal:  Positive for arthralgias, back pain and myalgias (feet and hands). Negative for neck pain and neck stiffness.   Skin:  Positive for color change (purple and white due to Raynauds). Negative for rash and wound.   Allergic/Immunologic: Negative for environmental allergies and food allergies.   Neurological:  Positive for headaches (occas.). Negative for dizziness, syncope, weakness, light-headedness and numbness.   Hematological: Negative.  Does not bruise/bleed easily.   Psychiatric/Behavioral: Negative.  Negative for sleep disturbance.        Objective     VITALS: /81 (BP Location: Right arm, Patient Position: Sitting)   Pulse 72   Ht 165.1 cm (65\")   Wt 71.8 kg (158 lb 6.4 oz)   SpO2 94% Comment: room air  BMI 26.36 kg/m²     LABS:   Lab Results (most recent)       None            IMAGING:   No Images in the past 120 days found..    EXAM:  Physical Exam  Constitutional:       Appearance: " Normal appearance.   Eyes:      Pupils: Pupils are equal, round, and reactive to light.   Cardiovascular:      Rate and Rhythm: Normal rate and regular rhythm.      Pulses:           Carotid pulses are 2+ on the right side and 2+ on the left side.       Radial pulses are 2+ on the right side and 2+ on the left side.        Dorsalis pedis pulses are 2+ on the right side and 2+ on the left side.        Posterior tibial pulses are 2+ on the right side and 2+ on the left side.      Heart sounds: Normal heart sounds.   Pulmonary:      Effort: Pulmonary effort is normal.      Breath sounds: Normal breath sounds.   Abdominal:      General: Bowel sounds are normal.      Palpations: Abdomen is soft.   Musculoskeletal:      Right lower leg: No edema.      Left lower leg: No edema.   Skin:     General: Skin is warm and dry.      Capillary Refill: Capillary refill takes less than 2 seconds.   Neurological:      General: No focal deficit present.      Mental Status: She is alert and oriented to person, place, and time.   Psychiatric:         Mood and Affect: Mood normal.         Thought Content: Thought content normal.         Procedure   Procedures       Assessment & Plan    Diagnosis Plan   1. Aortic atherosclerosis        2. Palpitations          Plan:  Aortic atherosclerosis:   Nuclear stress negative.  No current chest pain symptoms.  The patient was advised to notify our office if symptoms return or worsen.  Will obtain most recent lipid panel from Dr Padilla's office for review.    2. Palpitations: Event monitor does show sinus tach, no dysrhythmia.  No current symptoms.       Return in about 1 year (around 1/2/2025).    Patient did not bring med list or medicine bottles to appointment, med list has been reviewed and updated based on patient's knowledge of their meds.       MEDS ORDERED DURING VISIT:  No orders of the defined types were placed in this encounter.      DISCONTINUED MEDS DURING VISIT:   There are no  discontinued medications.       This document has been electronically signed by BRIANA Rosen  January 2, 2024 17:25 EST    Dictated Utilizing Dragon Dictation: Part of this note may be an electronic transcription/translation of spoken language to printed text using the Dragon Dictation System

## 2024-03-12 PROCEDURE — 93000 ELECTROCARDIOGRAM COMPLETE: CPT | Performed by: INTERNAL MEDICINE

## 2024-07-01 ENCOUNTER — OFFICE VISIT (OUTPATIENT)
Age: 59
End: 2024-07-01
Payer: COMMERCIAL

## 2024-07-01 VITALS
DIASTOLIC BLOOD PRESSURE: 84 MMHG | BODY MASS INDEX: 26.33 KG/M2 | TEMPERATURE: 97.4 F | HEIGHT: 65 IN | SYSTOLIC BLOOD PRESSURE: 118 MMHG | WEIGHT: 158 LBS | HEART RATE: 68 BPM

## 2024-07-01 DIAGNOSIS — M81.0 AGE-RELATED OSTEOPOROSIS WITHOUT CURRENT PATHOLOGICAL FRACTURE: ICD-10-CM

## 2024-07-01 DIAGNOSIS — Z79.899 HIGH RISK MEDICATION USE: ICD-10-CM

## 2024-07-01 DIAGNOSIS — R52 PAIN MANAGEMENT: ICD-10-CM

## 2024-07-01 DIAGNOSIS — I73.00 RAYNAUD'S DISEASE WITHOUT GANGRENE: ICD-10-CM

## 2024-07-01 DIAGNOSIS — K21.9 GASTROESOPHAGEAL REFLUX DISEASE WITHOUT ESOPHAGITIS: ICD-10-CM

## 2024-07-01 DIAGNOSIS — R20.2 PARESTHESIA: ICD-10-CM

## 2024-07-01 DIAGNOSIS — M54.40 ACUTE RIGHT-SIDED LOW BACK PAIN WITH SCIATICA, SCIATICA LATERALITY UNSPECIFIED: ICD-10-CM

## 2024-07-01 DIAGNOSIS — M34.9 SCLERODERMA: Primary | ICD-10-CM

## 2024-07-01 PROCEDURE — 99214 OFFICE O/P EST MOD 30 MIN: CPT | Performed by: INTERNAL MEDICINE

## 2024-07-01 RX ORDER — MYCOPHENOLATE MOFETIL 500 MG/1
1000 TABLET ORAL 2 TIMES DAILY
Qty: 120 TABLET | Refills: 3 | Status: SHIPPED | OUTPATIENT
Start: 2024-07-01

## 2024-07-01 RX ORDER — DILTIAZEM HYDROCHLORIDE 120 MG/1
120 CAPSULE, COATED, EXTENDED RELEASE ORAL DAILY
Qty: 30 CAPSULE | Refills: 3 | Status: SHIPPED | OUTPATIENT
Start: 2024-07-01

## 2024-07-01 RX ORDER — GABAPENTIN 100 MG/1
100 CAPSULE ORAL 3 TIMES DAILY
Qty: 90 CAPSULE | Refills: 3 | Status: SHIPPED | OUTPATIENT
Start: 2024-07-01

## 2024-07-01 RX ORDER — DILTIAZEM HYDROCHLORIDE 120 MG/1
120 CAPSULE, COATED, EXTENDED RELEASE ORAL DAILY
COMMUNITY
End: 2024-07-01 | Stop reason: SDUPTHER

## 2024-07-01 NOTE — ASSESSMENT & PLAN NOTE
Doing better on Diltiazem.    Worse in winter.     Plan:  Better on diltiazem  Keep extremities warm.  Hot hands or insoles.

## 2024-07-01 NOTE — ASSESSMENT & PLAN NOTE
R lower back . Radiates to ASIC occasionally into right thigh and into the rectum.     She fell 9/2023. Pain is gradually improving..    Plan:  Salon pas lidocaine patches at night.  Biofreeze.  She is going to start PT. If no better, PCP will order MRI.   Lumbar spine film 10/22 - minor spurring at L4-5 and facet arthritis at L5-S1.

## 2024-07-01 NOTE — ASSESSMENT & PLAN NOTE
She took Protonix in the past- Diarrhea  She has daily reflux. .    Plan:  Currently not on meds.   Denies reflex.   GI f/u pending in 8/24.

## 2024-07-01 NOTE — PROGRESS NOTES
[facility] [place] [facility] Follow Up Visit     Office Follow Up      Date: 07/01/2024   Patient Name: Sherie Reyes  MRN: [id number]  YOB: 1965    Referring Physician: Nghia Padilla MD     Chief Complaint   Patient presents with    Lupus       History of Present Illness: Sherie Reyes is a 59 y.o. female who is here today for follow up on Scleroderma.   She has 5 minutes of morning stiffness.   Her pain is 2/10 today in her back.   She does have food get hung in her throat.   She still sees Dr. Padilla for f/u with her fall.  She still has pain on the right lumbar, with occasional shooting pain down the front of the right leg.  She does have shooting pains from her rt. Lower back to her rectum. She cannot lay flat on her back.  She starts PT this week, if no improvement, they are sending her for an MRI.  Raynaud's is mild right now. Pt. Denies any lesions on finger tips. She is taking Diltiazem.   She denies any SOB or skin thickening.         She has a f/u in Pulmonology in 8/24.  F//U with Dr. Brendon Pena (esophagus evaluate) 8/24  History of Present Illness         Result Review :      Data reviewed : 3/24 CBC/CMP normal  10/23 AP Pelvis Negative.   10/23 Lumbar Spine --Mild to moderate degenerative changes L5-S1 with Facet Arthritis.  10/23 Pulmonology- Progressive Restriction on PFTS, CT scan was stable.  1/24 Cardiology Notes- Negative nuclear stress test and event monitor.  6/24 CBC/CMP normal.      Results            Subjective     No Known Allergies      Current Outpatient Medications:     dilTIAZem CD (CARDIZEM CD) 120 MG 24 hr capsule, Take 1 capsule by mouth Daily., Disp: 30 capsule, Rfl: 3    gabapentin (NEURONTIN) 100 MG capsule, Take 1 capsule by mouth 3 (Three) Times a Day., Disp: 90 capsule, Rfl: 3    mycophenolate (CELLCEPT) 500 MG tablet, Take 2 tablets by mouth 2 (Two) Times a Day., Disp: 120 tablet, Rfl: 3    Xiidra 5 % ophthalmic solution,  Administer 1 drop to both eyes 2 (Two) Times a Day., Disp: , Rfl:     Past Medical History:   Diagnosis Date    Aortic atherosclerosis     Arthritis     BCC (basal cell carcinoma)     RUE    Blepharospasm     Broken foot     LEFT    Collar bone fracture     LEFT    Condition not found     COLLAR BONE-BROKEN    Coronary artery disease     Interstial lung disease    Dry eyes     AND INFLAMMATION    Fatigue     GERD (gastroesophageal reflux disease)     H/O breast biopsy     RIGHT    Interstitial lung disease due to connective tissue disease     Kidney stones     Lupus     Mass of right upper extremity     MCTD (mixed connective tissue disease)     Mixed connective tissue disease     Osteoporosis     05/25/2022 -Osteopenia + Fracture = Osteoporosis.    Pineal gland cyst     OF BRAIN    Post-nasal drainage     SCC (squamous cell carcinoma)     Scleroderma     TURNER 10/17/2023 -LLE    SNHL (sensory-neural hearing loss), unilateral     LEFT    Vertigo     Wart     LEFT HAND        Past Surgical History:   Procedure Laterality Date    BLEPHAROPLASTY      ESOPHAGEAL DILATATION      HYSTERECTOMY      TUBAL ABDOMINAL LIGATION      UMBILICAL HERNIA REPAIR         Family History   Problem Relation Age of Onset    Hypertension Mother     Heart attack Mother         LIGHT    Heart attack Father     Stroke Father         Social History     Socioeconomic History    Marital status:    Tobacco Use    Smoking status: Never     Passive exposure: Past    Smokeless tobacco: Never   Vaping Use    Vaping status: Never Used   Substance and Sexual Activity    Alcohol use: Never    Drug use: Never    Sexual activity: Defer       Review of Systems   Constitutional:  Negative for fatigue and fever.   HENT:  Negative for mouth sores, nosebleeds, swollen glands and trouble swallowing.    Eyes:  Negative for blurred vision, double vision, photophobia, pain and visual disturbance.   Respiratory:  Negative for apnea, cough, choking and shortness  "of breath.    Cardiovascular:  Negative for chest pain, palpitations and leg swelling.   Gastrointestinal:  Negative for abdominal pain, blood in stool, constipation, diarrhea, nausea, vomiting and GERD.   Endocrine: Negative for cold intolerance, heat intolerance, polydipsia, polyphagia and polyuria.   Genitourinary:  Negative for difficulty urinating, dysuria, genital sores, hematuria and urinary incontinence.   Musculoskeletal:  Negative for arthralgias, back pain, gait problem, joint swelling, myalgias, neck pain, neck stiffness and bursitis.   Skin:  Negative for rash.   Allergic/Immunologic: Negative for environmental allergies and food allergies.   Neurological:  Negative for dizziness, tremors, seizures, syncope, weakness, numbness, headache, memory problem and confusion.   Hematological:  Negative for adenopathy. Does not bruise/bleed easily.   Psychiatric/Behavioral:  Negative for sleep disturbance, suicidal ideas, depressed mood and stress. The patient is not nervous/anxious.         I have reviewed and updated the patient's chief complaint, history of present illness, review of systems, past medical history, surgical history, family history, social history, medications and allergy list as appropriate.     Objective    Vital Signs:   Vitals:    07/01/24 1120   BP: 118/84   Pulse: 68   Temp: 97.4 °F (36.3 °C)   Weight: 71.7 kg (158 lb)   Height: 165.1 cm (65\")   PainSc:   2     Sherie Reyes reports a pain score of 2.  Given her pain assessment as noted, treatment options were discussed and the following options were decided upon as a follow-up plan to address the patient's pain: continuation of current treatment plan for pain.      Body mass index is 26.29 kg/m².         Physical Exam  Constitutional:       General: She is not in acute distress.     Appearance: She is not ill-appearing, toxic-appearing or diaphoretic.   HENT:      Right Ear: External ear normal.      Left Ear: External ear normal.      Nose: " Nose normal. No congestion.      Mouth/Throat:      Pharynx: Oropharynx is clear. No oropharyngeal exudate or posterior oropharyngeal erythema.   Eyes:      Extraocular Movements: Extraocular movements intact.      Conjunctiva/sclera: Conjunctivae normal.      Pupils: Pupils are equal, round, and reactive to light.   Cardiovascular:      Rate and Rhythm: Normal rate and regular rhythm.      Pulses: Normal pulses.      Heart sounds: Normal heart sounds.   Pulmonary:      Effort: Pulmonary effort is normal.      Breath sounds: Normal breath sounds.      Comments: Crackles  Abdominal:      General: Abdomen is flat. Bowel sounds are normal.      Palpations: Abdomen is soft.   Musculoskeletal:         General: Normal range of motion.      Right knee: Crepitus present.      Left knee: Crepitus present.      Comments: Tenderness of lower lumbar.   She has occasional dilated capillary loops in the nail beds.    Skin:     General: Skin is warm and dry.      Capillary Refill: Capillary refill takes less than 2 seconds.      Comments: Soft skin.  Varicose Veins present    Neurological:      General: No focal deficit present.      Mental Status: She is oriented to person, place, and time.      Cranial Nerves: No cranial nerve deficit.      Motor: No weakness.      Deep Tendon Reflexes: Reflexes normal.   Psychiatric:         Mood and Affect: Mood normal.         Behavior: Behavior normal.         Thought Content: Thought content normal.        Physical Exam      Physical Exam  There is currently no information documented on the homunculus. Go to the Rheumatology activity and complete the homunculus joint exam.     Results Review:   Imaging Results (Last 24 Hours)       ** No results found for the last 24 hours. **            Procedures    Assessment / Plan    Assessment/Plan:   Diagnoses and all orders for this visit:    1. Scleroderma (Primary)  Assessment & Plan:  Positive dysphagia, raynaud's, nail fold capillary changes,  ILD, Positive centromere ab, Telangiectasias.  1:160 Nucleolar, UA neg, Santos, RNP, SSA/ssb negative, DNA negative, Positive Centromere 252(100), SCL 70 neg.  CT scan June 2023- Coronary artery disease calcifications of L main. Mild atherosclerotic disease of aorta of the lungs. Stable sub pleural upper lobe bilateral nodular densities. Stable patch of opacity in the posterior superior segment of RUL. No new lung findings. She had stable sub pleural densities along lower lobes posteriorly R>L, Small HH, atherosclerotic of the upper abdomen.  Her actual lab reports are consistent with MCTD versus CREST.  Echo annually - looked good this time, but they did not calculate pulmonary artery pressure which needs to be done.     Plan:  Pulmonary f/u pending in 8/24  GI f/u pending 8/24.   Currently on 3 Cellcept daily. I recommend increasing Cellcept to 2 tabs twice a day.    Labs currently stable.      Orders:  -     mycophenolate (CELLCEPT) 500 MG tablet; Take 2 tablets by mouth 2 (Two) Times a Day.  Dispense: 120 tablet; Refill: 3  -     dilTIAZem CD (CARDIZEM CD) 120 MG 24 hr capsule; Take 1 capsule by mouth Daily.  Dispense: 30 capsule; Refill: 3  -     gabapentin (NEURONTIN) 100 MG capsule; Take 1 capsule by mouth 3 (Three) Times a Day.  Dispense: 90 capsule; Refill: 3  -     CBC & Differential; Future  -     Comprehensive Metabolic Panel; Future    2. Gastroesophageal reflux disease without esophagitis  Assessment & Plan:  She took Protonix in the past- Diarrhea  She has daily reflux. .    Plan:  Currently not on meds.   Denies reflex.   GI f/u pending in 8/24.       3. Raynaud's disease without gangrene  Assessment & Plan:  Doing better on Diltiazem.    Worse in winter.     Plan:  Better on diltiazem  Keep extremities warm.  Hot hands or insoles.          4. Acute right-sided low back pain with sciatica, sciatica laterality unspecified  Assessment & Plan:  R lower back . Radiates to ASIC occasionally into right  thigh and into the rectum.     She fell 9/2023. Pain is gradually improving..    Plan:  Salon pas lidocaine patches at night.  Biofreeze.  She is going to start PT. If no better, PCP will order MRI.   Lumbar spine film 10/22 - minor spurring at L4-5 and facet arthritis at L5-S1.            5. Age-related osteoporosis without current pathological fracture  Assessment & Plan:  Osteoporosis lumbar spine. Osteopenia of the hip.   No treatment currently. With her h/o scleroderma and reflux my recommendations would be IV Reclast or Prolia.   DEXA 10/28/23- T score spine -2.6, decreased 3.8% since 2020. Total L hip -1.0, increased by 1.9%. Femoral neck L -1.2. L3 -2.7, L4 -3.5. Severe risk of fracture of spine.  Osteoporosis workup of N- telopeptide, osteocalcin, intact PTH, TSH, Vit D, and SPEP normal 2/23    Plan:    DEXA due 10/28/2024  1000-2000IU of vitamin D 3 gel caps per day.  800-1000MG of Calcium citrate per day.  Weight bearing exercise.  With her h/o scleroderma and reflux my recommendations would be IV Reclast or Prolia. We re discussed this today, options would be IV Reclast 3-5 years, then a drug holiday. Or Prolia sub q every 6 months, indefinitely. No Prolia 3 months before or 3 months after dental extraction, implants or root canal.        6. Paresthesia  Assessment & Plan:  Tingling in hands, arms, and legs. Also on face and head.  Saw Dr. Burrows in neurology and he has referred her to [facility] for eval.   She takes gabapentin 100mg once daily.  She has an appt pending with [facility]    Plan:  We have been prescribing gabapentin.  Was suppose to see [facility] but they had to postpone. Will f/u in the future.       7. Pain management  Assessment & Plan:  No longer seeing prescribing physician    Plan:    Gabapentin 100mg tid        Orders:  -     mycophenolate (CELLCEPT) 500 MG tablet; Take 2 tablets by mouth 2 (Two) Times a Day.  Dispense: 120 tablet; Refill: 3  -     dilTIAZem CD (CARDIZEM CD) 120 MG 24  hr capsule; Take 1 capsule by mouth Daily.  Dispense: 30 capsule; Refill: 3  -     gabapentin (NEURONTIN) 100 MG capsule; Take 1 capsule by mouth 3 (Three) Times a Day.  Dispense: 90 capsule; Refill: 3  -     CBC & Differential; Future  -     Comprehensive Metabolic Panel; Future    8. High risk medication use  Assessment & Plan:  Cellcept - well tolerated but needs increased secondary to pulmonary testing.  S/p Covid Vaccine x1 J&J. She defers any boosters.      Plan:  Cbc, Cmp Q 2 months - 6/24  Would hold Cellcept for any infection or illness, Seek treatment and when well and illness is resolved you may restart medication.      Orders:  -     mycophenolate (CELLCEPT) 500 MG tablet; Take 2 tablets by mouth 2 (Two) Times a Day.  Dispense: 120 tablet; Refill: 3  -     dilTIAZem CD (CARDIZEM CD) 120 MG 24 hr capsule; Take 1 capsule by mouth Daily.  Dispense: 30 capsule; Refill: 3  -     gabapentin (NEURONTIN) 100 MG capsule; Take 1 capsule by mouth 3 (Three) Times a Day.  Dispense: 90 capsule; Refill: 3  -     CBC & Differential; Future  -     Comprehensive Metabolic Panel; Future          Assessment & Plan          Follow Up:   Return in about 4 months (around 11/1/2024).    Scribed for Luba Astorga MD by Luba Astorga MD. 7/2/2024  07:59 EDT       I, Luba Astorga MD, personally performed the services described in this documentation as scribed by the above named individual in my presence, and it is both accurate and complete.  7/2/2024  07:59 EDT    Luba Astorga MD  [facility] [place] [facility]

## 2024-07-01 NOTE — ASSESSMENT & PLAN NOTE
Cellcept - well tolerated but needs increased secondary to pulmonary testing.  S/p Covid Vaccine x1 J&J. She defers any boosters.      Plan:  Cbc, Cmp Q 2 months - 6/24  Would hold Cellcept for any infection or illness, Seek treatment and when well and illness is resolved you may restart medication.

## 2024-07-01 NOTE — ASSESSMENT & PLAN NOTE
Osteoporosis lumbar spine. Osteopenia of the hip.   No treatment currently. With her h/o scleroderma and reflux my recommendations would be IV Reclast or Prolia.   DEXA 10/28/23- T score spine -2.6, decreased 3.8% since 2020. Total L hip -1.0, increased by 1.9%. Femoral neck L -1.2. L3 -2.7, L4 -3.5. Severe risk of fracture of spine.  Osteoporosis workup of N- telopeptide, osteocalcin, intact PTH, TSH, Vit D, and SPEP normal 2/23    Plan:    DEXA due 10/28/2024  1000-2000IU of vitamin D 3 gel caps per day.  800-1000MG of Calcium citrate per day.  Weight bearing exercise.  With her h/o scleroderma and reflux my recommendations would be IV Reclast or Prolia. We re discussed this today, options would be IV Reclast 3-5 years, then a drug holiday. Or Prolia sub q every 6 months, indefinitely. No Prolia 3 months before or 3 months after dental extraction, implants or root canal.

## 2024-07-01 NOTE — ASSESSMENT & PLAN NOTE
Positive dysphagia, raynaud's, nail fold capillary changes, ILD, Positive centromere ab, Telangiectasias.  1:160 Nucleolar, UA neg, Santos, RNP, SSA/ssb negative, DNA negative, Positive Centromere 252(100), SCL 70 neg.  CT scan June 2023- Coronary artery disease calcifications of L main. Mild atherosclerotic disease of aorta of the lungs. Stable sub pleural upper lobe bilateral nodular densities. Stable patch of opacity in the posterior superior segment of RUL. No new lung findings. She had stable sub pleural densities along lower lobes posteriorly R>L, Small HH, atherosclerotic of the upper abdomen.  Her actual lab reports are consistent with MCTD versus CREST.  Echo annually - looked good this time, but they did not calculate pulmonary artery pressure which needs to be done.     Plan:  Pulmonary f/u pending in 8/24  GI f/u pending 8/24.   Currently on 3 Cellcept daily. I recommend increasing Cellcept to 2 tabs twice a day.    Labs currently stable.

## 2024-07-01 NOTE — ASSESSMENT & PLAN NOTE
Tingling in hands, arms, and legs. Also on face and head.  Saw Dr. Burrows in neurology and he has referred her to [facility] for eval.   She takes gabapentin 100mg once daily.  She has an appt pending with [facility]    Plan:  We have been prescribing gabapentin.  Was suppose to see [facility] but they had to postpone. Will f/u in the future.

## 2024-07-16 ENCOUNTER — TELEPHONE (OUTPATIENT)
Age: 59
End: 2024-07-16
Payer: COMMERCIAL

## 2024-07-16 NOTE — TELEPHONE ENCOUNTER
Per legacy system patient was referred to [facility] for significant gerd and GI issues.  Her appt was scheduled for 2/26/24, I called there office and requested note on voicemail.

## 2024-10-28 ENCOUNTER — TELEPHONE (OUTPATIENT)
Age: 59
End: 2024-10-28
Payer: COMMERCIAL

## 2024-10-28 NOTE — TELEPHONE ENCOUNTER
Caller: Sherie Reyes    Relationship: Self    Best call back number: [phone]    What is the best time to reach you: ANYTIME    Who are you requesting to speak with (clinical staff, provider,  specific staff member): CLINICAL STAFF        What was the call regarding: EVERY 2 MONTHS SHE HAS LAB WORK.  SHE SAID IT SHOULD BE COMING UP, CAN YOU SETUP AND MAIL HER A COPY OF THE ORDER TO TAKE WITH HER?     SHE DOESN'T HAVE A FOLLOW-UP APPT UNTIL MARCH 2025

## 2024-10-30 DIAGNOSIS — Z79.899 HIGH RISK MEDICATION USE: Primary | ICD-10-CM

## 2024-10-30 DIAGNOSIS — M34.9 SCLERODERMA: ICD-10-CM

## 2025-02-12 ENCOUNTER — TELEPHONE (OUTPATIENT)
Age: 60
End: 2025-02-12
Payer: COMMERCIAL

## 2025-02-12 NOTE — TELEPHONE ENCOUNTER
PT APPT HAD TO BE CANCELLED. IF PT CALLS BACK TO RESCHEDULE, PLEASE SCHEDULE WITH TURNER DEVI OR SUSAN. PLEASE ADD PT TO THE CANCELLATION LIST AS NORMAL PRIOTIRY WITH AN END DATE OF 12/31/2025.   -HUB READY TO SCHEDULE.

## 2025-03-06 ENCOUNTER — TELEPHONE (OUTPATIENT)
Age: 60
End: 2025-03-06
Payer: COMMERCIAL

## 2025-03-06 NOTE — TELEPHONE ENCOUNTER
Caller: Sherie Reyes    Relationship: Self    Best call back number: [phone]     What is the best time to reach you: ANY      What was the call regarding: PATIENT CONTACTED TO REQUEST THAT THEIR MEDICAL RECORDS BE MAILED TO    [address] [place] [address]     PLEASE CONTACT PATIENT AND ADVISE WHEN MAILED

## 2025-03-10 NOTE — TELEPHONE ENCOUNTER
Caller: Sherie Reyes    Relationship: Self    Best call back number: [phone]     What is the best time to reach you: ANY      What was the call regarding: PATIENT HAD APPOINT WITH NEW PROVIDER ON THE 18TH. PATIENT WANTS ALL RECORDS NOT JUST LAST FEW APPOINTMENTS. CAN THIS BE DONE BY THEN? IT LOOKS LIKE RELEASE WAS MAILED OUT. PATIENT STATES THAT THEY WERE NEVER CONTACTED THAT THE  RELEASE WAS SENT. NEW DR MARKO LI RECORDS CAN BE FAXED TO HER OFFICE PER PATIENT [phone] AT [facility]. PLEASE CONTACT PATIENT AND ADVISE

## 2025-07-15 ENCOUNTER — OFFICE VISIT (OUTPATIENT)
Dept: CARDIOLOGY | Facility: CLINIC | Age: 60
End: 2025-07-15
Payer: COMMERCIAL

## 2025-07-15 VITALS
SYSTOLIC BLOOD PRESSURE: 124 MMHG | BODY MASS INDEX: 26.56 KG/M2 | DIASTOLIC BLOOD PRESSURE: 77 MMHG | WEIGHT: 159.4 LBS | OXYGEN SATURATION: 96 % | HEIGHT: 65 IN | HEART RATE: 63 BPM

## 2025-07-15 DIAGNOSIS — R00.2 PALPITATIONS: ICD-10-CM

## 2025-07-15 DIAGNOSIS — I73.00 RAYNAUD'S DISEASE WITHOUT GANGRENE: ICD-10-CM

## 2025-07-15 DIAGNOSIS — R06.02 SHORTNESS OF BREATH: ICD-10-CM

## 2025-07-15 DIAGNOSIS — I70.0 AORTIC ATHEROSCLEROSIS: Primary | ICD-10-CM

## 2025-07-15 DIAGNOSIS — R07.2 PRECORDIAL PAIN: ICD-10-CM

## 2025-07-15 PROCEDURE — 93000 ELECTROCARDIOGRAM COMPLETE: CPT | Performed by: INTERNAL MEDICINE

## 2025-07-15 PROCEDURE — 99214 OFFICE O/P EST MOD 30 MIN: CPT | Performed by: INTERNAL MEDICINE

## 2025-07-15 NOTE — PROGRESS NOTES
Subjective   Sherie Reyes is a 60 y.o. female     Chief Complaint   Patient presents with    Follow-up     Here for yearly f/u    Coronary Artery Disease    Palpitations       PROBLEM LIST:     Chest Pain  1.1 Stress test, 2-. No ischemia  1.2 11/14/23: nuclear stress: negative for ischemia, EF 73%  Shortness of breath  Palpitations  Aortic atherosclerosis per LDCT at [facility] on 6-9-2023  Scleroderma  ILD, followed by Dr. Sharma.  Lupus  8. JOANIE / PAD  8.1. Carotid U/S, 2-. 16-49% mid / distal RE. <= to 15% in LICA  9. Echo, 11-4-2022. EF 55-60%, trivial MR / AI, physiologic TR, trivial pericardial effusion  10. Cardiac event monitor: NSR baseline, max , min 51.  Frequent sinus tachycardia.  No ectopy, arrhythmia or block noted.     11. Lower extremity duplex: No evidence of hemodynamically significant stenosis    Specialty Problems          Cardiology Problems    Aortic atherosclerosis        Palpitations        Raynaud disease             HPI:  Mr. Reyes returns for follow-up on the above.    She continues to do well from a cardiovascular standpoint.  She describes no chest pain, orthopnea, PND, or change in mild and intermittent lower extremity edema.  She describes stable functional capacity.  She has no symptoms of peripheral arterial disease or of arterial embolic events except for Raynaud's phenomenon.    Blood pressures have been well-controlled.  Lipids from January demonstrated total cholesterol of 218, triglycerides 137, HDL 53, and LDL of 138.    Several weeks ago Mr. Reyes had an episode of lightheadedness, dizziness, and imbalance.  She had no vertigo and she was not palpitating.  Symptoms or not orthostatic in nature.  She had to steady herself and symptoms resolved spontaneously within 1 minute.  She had no ongoing sequelae from that event.                      PRIOR MEDICATIONS    Current Outpatient Medications on File Prior to Visit   Medication Sig Dispense Refill    mycophenolate  (CELLCEPT) 500 MG tablet Take 2 tablets by mouth 2 (Two) Times a Day. 120 tablet 3    [DISCONTINUED] dilTIAZem CD (CARDIZEM CD) 120 MG 24 hr capsule Take 1 capsule by mouth Daily. 30 capsule 3    [DISCONTINUED] gabapentin (NEURONTIN) 100 MG capsule Take 1 capsule by mouth 3 (Three) Times a Day. 90 capsule 3    [DISCONTINUED] Xiidra 5 % ophthalmic solution Administer 1 drop to both eyes 2 (Two) Times a Day.       No current facility-administered medications on file prior to visit.       ALLERGIES:    Patient has no known allergies.    PAST MEDICAL HISTORY:    Past Medical History:   Diagnosis Date    Aortic atherosclerosis     Arthritis     BCC (basal cell carcinoma)     RUE    Blepharospasm     Broken foot     LEFT    Collar bone fracture     LEFT    Condition not found     COLLAR BONE-BROKEN    Coronary artery disease     Interstial lung disease    Dry eyes     AND INFLAMMATION    Fatigue     GERD (gastroesophageal reflux disease)     H/O breast biopsy     RIGHT    Interstitial lung disease due to connective tissue disease     Kidney stones     Lupus     Mass of right upper extremity     MCTD (mixed connective tissue disease)     Mixed connective tissue disease     Osteoporosis     05/25/2022 -Osteopenia + Fracture = Osteoporosis.    Pineal gland cyst     OF BRAIN    Post-nasal drainage     SCC (squamous cell carcinoma)     Scleroderma     TURNER 10/17/2023 -LLE    SNHL (sensory-neural hearing loss), unilateral     LEFT    Vertigo     Wart     LEFT HAND       SURGICAL HISTORY:    Past Surgical History:   Procedure Laterality Date    BLEPHAROPLASTY      COLON SURGERY      lap. bowel surgery for obstruction    ESOPHAGEAL DILATATION      HYSTERECTOMY      TUBAL ABDOMINAL LIGATION      UMBILICAL HERNIA REPAIR         SOCIAL HISTORY:    Social History     Socioeconomic History    Marital status:    Tobacco Use    Smoking status: Never     Passive exposure: Past    Smokeless tobacco: Never   Vaping Use    Vaping  "status: Never Used   Substance and Sexual Activity    Alcohol use: Never    Drug use: Never    Sexual activity: Defer       FAMILY HISTORY:    Family History   Problem Relation Age of Onset    Hypertension Mother     Heart attack Mother         LIGHT    Heart attack Father     Stroke Father        Review of Systems   Constitutional:  Positive for fatigue.   HENT: Negative.     Eyes: Negative.    Respiratory:  Positive for shortness of breath (occas.).    Cardiovascular:  Positive for chest pain (occas.), palpitations and leg swelling.   Gastrointestinal: Negative.    Endocrine: Negative.    Genitourinary: Negative.    Musculoskeletal:  Positive for arthralgias and myalgias.   Skin: Negative.    Allergic/Immunologic: Negative.    Neurological:  Positive for dizziness. Negative for syncope and light-headedness.   Hematological: Negative.    Psychiatric/Behavioral: Negative.         VISIT VITALS:  Vitals:    07/15/25 0920   BP: 124/77   BP Location: Left arm   Patient Position: Sitting   Pulse: 63   SpO2: 96%   Weight: 72.3 kg (159 lb 6.4 oz)   Height: 165.1 cm (65\")      /77 (BP Location: Left arm, Patient Position: Sitting)   Pulse 63   Ht 165.1 cm (65\")   Wt 72.3 kg (159 lb 6.4 oz)   SpO2 96%   BMI 26.53 kg/m²     RECENT LABS:    Objective       Physical Exam  Vitals and nursing note reviewed.   Constitutional:       General: She is not in acute distress.     Appearance: She is well-developed.   HENT:      Head: Normocephalic and atraumatic.   Eyes:      Conjunctiva/sclera: Conjunctivae normal.      Pupils: Pupils are equal, round, and reactive to light.   Neck:      Vascular: No carotid bruit, hepatojugular reflux or JVD.      Trachea: No tracheal deviation.      Comments: Nl. Carotid upstrokes  Cardiovascular:      Rate and Rhythm: Normal rate and regular rhythm.      Pulses:           Radial pulses are 2+ on the right side and 2+ on the left side.      Heart sounds: Normal heart sounds, S1 normal and " S2 normal. No murmur heard.     No friction rub. No S3 or S4 sounds.   Pulmonary:      Effort: Pulmonary effort is normal.      Breath sounds: Examination of the right-lower field reveals rales. Examination of the left-lower field reveals rales. Rales (velcro crackles > rt. than lt.) present. No wheezing or rhonchi.      Comments: Nl. Expir. Phase  Nl. Breath sound intensity    Abdominal:      General: Bowel sounds are normal. There is no distension or abdominal bruit.      Palpations: Abdomen is soft. There is no mass.      Tenderness: There is no abdominal tenderness. There is no guarding or rebound.      Comments: No organomegaly   Musculoskeletal:         General: No tenderness or deformity. Normal range of motion.      Cervical back: Normal range of motion and neck supple.      Right lower leg: No edema.      Left lower leg: No edema.      Comments: LLE, no edema, palpable pedal pulses, cap. Refill 4 sec.   RLE, no edema, palpable pedal pulses, cap. Refill 4 sec.   Toes cool   Skin:     General: Skin is warm and dry.      Coloration: Skin is not pale.      Findings: No erythema or rash.   Neurological:      Mental Status: She is alert and oriented to person, place, and time.   Psychiatric:         Behavior: Behavior normal.         Thought Content: Thought content normal.         Judgment: Judgment normal.           ECG 12 Lead    Date/Time: 7/15/2025 9:26 AM  Performed by: Darinel Devine MD    Authorized by: Darinel Devine MD  Comparison: compared with previous ECG from 11/2/2023            Assessment & Plan   #1 1.  Dyslipidemia.  I have asked Ms. Reyes to discuss with her rheumatologist the possibility of using lipid-lowering therapy in the setting of her infusions for her autoimmune disease.  If able I would like to trial low-dose statin with a target LDL of less than 100 and preferably in the 70s.    2.  Palpitations.  The patient has had no palpitations recently and prior event monitoring  demonstrated no significant dysrhythmia.  We will continue to follow clinically.    3.  History of chest pain.  The patient has none currently.  We will monitor.    4.  A single episode of dizziness as described.  The patient will report any significant recurrence.    5.  Raynaud's phenomenon and trivial pericardial effusion.  Both are likely due to the patient's diffuse autoimmune disease.  She report any change in symptoms which might represent impaired cardiac reserve or worsening of Raynaud's type symptoms.    6.  Ms. Reyes will follow with Dr. Padilla as instructed we will plan on seeing her in follow-up in 1 year or on an as-needed basis as discussed.   Diagnosis Plan   1. Aortic atherosclerosis        2. Palpitations        3. Precordial pain        4. Raynaud's disease without gangrene        5. Shortness of breath            No follow-ups on file.         Sherie Reyes  reports that she has never smoked. She has been exposed to tobacco smoke. She has never used smokeless tobacco. I have educated her on the risk of diseases from using tobacco products such as cancer, COPD, and heart disease.                 DO YOU VAPE? NO    BMI is >= 25 and <30. (Overweight) The following options were offered after discussion;: pcp addressing               Electronically signed by:    Scribed for Darinel Devine MD by Samreen Motta LPN on July 15, 2025  at 09:26 EDT    I, Darinel Devine MD personally performed the services described in this documentation as scribed by the above named individual in my presence, and it is both accurate and complete. July 15, 2025 09:26 EDT      Dictated Utilizing Dragon Dictation: Part of this note may be an electronic transcription/translation of spoken language to printed text using the Dragon Dictation System.